# Patient Record
Sex: MALE | Race: BLACK OR AFRICAN AMERICAN | NOT HISPANIC OR LATINO | Employment: FULL TIME | URBAN - METROPOLITAN AREA
[De-identification: names, ages, dates, MRNs, and addresses within clinical notes are randomized per-mention and may not be internally consistent; named-entity substitution may affect disease eponyms.]

---

## 2019-06-19 ENCOUNTER — HOSPITAL ENCOUNTER (EMERGENCY)
Facility: HOSPITAL | Age: 49
Discharge: HOME | End: 2019-06-20
Attending: EMERGENCY MEDICINE

## 2019-06-19 DIAGNOSIS — T40.601A OPIATE OVERDOSE, ACCIDENTAL OR UNINTENTIONAL, INITIAL ENCOUNTER: Primary | ICD-10-CM

## 2019-06-19 DIAGNOSIS — R42 DIZZINESS: ICD-10-CM

## 2019-06-19 LAB
BUN SERPL-MCNC: 16 MG/DL (ref 6–30)
CHLORIDE SERPL-SCNC: 102 MMOL/L (ref 95–110)
CREAT SERPL-MCNC: 1.3 MG/DL (ref 0.5–1.4)
GLUCOSE SERPL-MCNC: 193 MG/DL (ref 70–110)
HCT VFR BLD CALC: 44 %PCV (ref 36–54)
POC IONIZED CALCIUM: 1.1 MMOL/L (ref 1.06–1.42)
POC TCO2 (MEASURED): 20 MMOL/L (ref 23–29)
POTASSIUM BLD-SCNC: 3.3 MMOL/L (ref 3.5–5.1)
SAMPLE: ABNORMAL
SODIUM BLD-SCNC: 138 MMOL/L (ref 136–145)

## 2019-06-19 PROCEDURE — 99291 CRITICAL CARE FIRST HOUR: CPT | Mod: 25

## 2019-06-19 PROCEDURE — 25000003 PHARM REV CODE 250: Performed by: EMERGENCY MEDICINE

## 2019-06-19 PROCEDURE — 93010 EKG 12-LEAD: ICD-10-PCS | Mod: ,,, | Performed by: INTERNAL MEDICINE

## 2019-06-19 PROCEDURE — 99291 CRITICAL CARE FIRST HOUR: CPT | Mod: ,,, | Performed by: EMERGENCY MEDICINE

## 2019-06-19 PROCEDURE — 80047 BASIC METABLC PNL IONIZED CA: CPT

## 2019-06-19 PROCEDURE — 93005 ELECTROCARDIOGRAM TRACING: CPT

## 2019-06-19 PROCEDURE — 82962 GLUCOSE BLOOD TEST: CPT

## 2019-06-19 PROCEDURE — 99291 PR CRITICAL CARE, E/M 30-74 MINUTES: ICD-10-PCS | Mod: ,,, | Performed by: EMERGENCY MEDICINE

## 2019-06-19 PROCEDURE — 93010 ELECTROCARDIOGRAM REPORT: CPT | Mod: ,,, | Performed by: INTERNAL MEDICINE

## 2019-06-19 PROCEDURE — 96374 THER/PROPH/DIAG INJ IV PUSH: CPT

## 2019-06-19 PROCEDURE — 63600175 PHARM REV CODE 636 W HCPCS: Performed by: EMERGENCY MEDICINE

## 2019-06-19 PROCEDURE — 96375 TX/PRO/DX INJ NEW DRUG ADDON: CPT

## 2019-06-19 RX ORDER — NALOXONE HCL 0.4 MG/ML
2 VIAL (ML) INJECTION
Status: COMPLETED | OUTPATIENT
Start: 2019-06-19 | End: 2019-06-19

## 2019-06-19 RX ORDER — ONDANSETRON 2 MG/ML
4 INJECTION INTRAMUSCULAR; INTRAVENOUS
Status: COMPLETED | OUTPATIENT
Start: 2019-06-19 | End: 2019-06-19

## 2019-06-19 RX ORDER — POTASSIUM CHLORIDE 20 MEQ/1
40 TABLET, EXTENDED RELEASE ORAL
Status: COMPLETED | OUTPATIENT
Start: 2019-06-19 | End: 2019-06-19

## 2019-06-19 RX ADMIN — NALOXONE HYDROCHLORIDE 2 MG: 0.4 INJECTION, SOLUTION INTRAMUSCULAR; INTRAVENOUS; SUBCUTANEOUS at 08:06

## 2019-06-19 RX ADMIN — POTASSIUM CHLORIDE 40 MEQ: 1500 TABLET, EXTENDED RELEASE ORAL at 08:06

## 2019-06-19 RX ADMIN — ONDANSETRON 4 MG: 2 INJECTION INTRAMUSCULAR; INTRAVENOUS at 08:06

## 2019-06-20 VITALS
RESPIRATION RATE: 18 BRPM | OXYGEN SATURATION: 99 % | WEIGHT: 260 LBS | SYSTOLIC BLOOD PRESSURE: 134 MMHG | TEMPERATURE: 98 F | BODY MASS INDEX: 37.31 KG/M2 | HEART RATE: 84 BPM | DIASTOLIC BLOOD PRESSURE: 72 MMHG

## 2019-06-20 LAB — POCT GLUCOSE: 175 MG/DL (ref 70–110)

## 2019-06-20 NOTE — ED NOTES
"LOC: Patient name and date of birth verified.  The patient is awake, alert and aware of environment with an appropriate affect, the patient is oriented x 3 and speaking appropriately.  Pt in NAD.      APPEARANCE: Patient appears ill, patient diaphoretic c/o dizziness.     SKIN: The skin is warm and dry, color consistent with ethnicity.    MUSCULOSKELETAL: Patient moving all extremities well, no obvious swelling or deformities noted. Denies weakness. States "I am very sleepy".     RESPIRATORY: Airway is open and patent, respirations are spontaneous, patient has a normal effort and rate, no accessory muscle use noted.    CARDIAC: Patient has a normal rate and rhythm. No c/o of chest pain.     ABDOMEN: Soft and non tender to palpation, no distention noted. Pt denies diarrhea. Endorses nausea    NEUROLOGIC: Eyes open spontaneously, behavior appropriate to situation, follows commands, pupils pinpoint and reactive. Speech clear.   "

## 2019-06-20 NOTE — ED TRIAGE NOTES
"López Alba, a 48 y.o. male presents to the ED w/ complaint of overdosing on what he thought was cocaine. Pt states he uses 1 or 2 times a week. Pt states "my friend gave me white powder which I thought was cocaine but it was supposedly heroin". Pt has no memory of LOC. Pt unaware of whom called EMS. Upon EMS arrival pt unresponsive and received total of 4 narcan. Pt diaphoretic and c/o dizziness at this time. Pt drowsy and appears ill upon arrival. Family at bedside, pt tearful with family.     Triage note:  Chief Complaint   Patient presents with    Drug Overdose     Pt reports having taken cocaine and is now experiencing dizziness, pt diaphoretic. AAOx4. Pt unresponsive upon arrival - EMS administered 4 total of Narcan.     Review of patient's allergies indicates:  No Known Allergies  No past medical history on file.  "
DISPLAY PLAN FREE TEXT

## 2019-06-20 NOTE — ED NOTES
"Pt AAO x 4. Pt states "feeling much better". Pupils 2 mm and reactive. Family at bedside. Will continue to monitor.   "

## 2019-06-20 NOTE — ED PROVIDER NOTES
Encounter Date: 6/19/2019    SCRIBE #1 NOTE: I, Wilbert Clemons, am scribing for, and in the presence of,  Dr. Plummer. I have scribed the following portions of the note - Other sections scribed: HPI, ROS, PE.       History     Chief Complaint   Patient presents with    Drug Overdose     Pt reports having taken cocaine and is now experiencing dizziness, pt diaphoretic. AAOx4. Pt unresponsive upon arrival - EMS administered 4 total of Narcan.     López Alba is a 48 y.o. male who presents today to the ED via EMS with a chief complaint of drug overdose. EMS states they found the patient unresponsive in his vehicle after doing heroin. Patient thought he was doing cocaine intranasally, but was actually given heroin. Unsure how long he was out for. He was given 2 of narcan intranasally, and then EMS gave him 2 of narcan IV. Patient does not take any other pills. Reports he was drinking beer at the time of the overdose. He does cocaine, but not everyday. Denies heroin usage. Endorses lightheadedness and dizziness.    The history is provided by the patient, the EMS personnel and medical records.     Review of patient's allergies indicates:  No Known Allergies  History reviewed. No pertinent past medical history.  History reviewed. No pertinent surgical history.  History reviewed. No pertinent family history.  Social History     Tobacco Use    Smoking status: Never Smoker   Substance Use Topics    Alcohol use: No    Drug use: Not on file     Review of Systems   Constitutional: Negative for fever.   HENT: Negative for sore throat.    Eyes: Negative for pain.   Respiratory: Negative for cough.    Cardiovascular: Negative for chest pain.   Gastrointestinal: Negative for vomiting.   Genitourinary: Negative for dysuria.   Musculoskeletal: Negative for back pain.   Skin: Negative for rash.   Neurological: Positive for dizziness and light-headedness.       Physical Exam     Initial Vitals [06/19/19 1947]   BP Pulse Resp Temp  SpO2   134/84 96 14 97.6 °F (36.4 °C) 98 %      MAP       --         Physical Exam    Nursing note and vitals reviewed.  Constitutional: He is diaphoretic.   Patient appears slightly ill.   HENT:   Mouth/Throat: Oropharynx is clear and moist.   Speaking clearly.   Eyes:   Pupils are 2 mm bilaterally.   Neck: Neck supple.   Cardiovascular: Normal rate, regular rhythm and normal heart sounds.   Pulmonary/Chest: No respiratory distress. He has no wheezes. He has no rhonchi. He has no rales.   Abdominal: Soft. He exhibits no distension. There is no tenderness.   Musculoskeletal: He exhibits no edema (extremity).   Neurological: He is alert.   Answering appropriate questions. No focal weaknesses.   Skin: Skin is warm.         ED Course   Procedures  Labs Reviewed   ISTAT PROCEDURE - Abnormal; Notable for the following components:       Result Value    POC Glucose 193 (*)     POC Potassium 3.3 (*)     POC TCO2 (MEASURED) 20 (*)     All other components within normal limits   POCT GLUCOSE - Abnormal; Notable for the following components:    POCT Glucose 175 (*)     All other components within normal limits        ECG Results          EKG 12-lead (Final result)  Result time 06/20/19 09:12:40    Final result by Interface, Lab In [facility] (06/20/19 09:12:40)                 Narrative:    Test Reason : R42,    Vent. Rate : 083 BPM     Atrial Rate : 083 BPM     P-R Int : 188 ms          QRS Dur : 098 ms      QT Int : 400 ms       P-R-T Axes : 050 053 035 degrees     QTc Int : 470 ms    Normal sinus rhythm  Normal ECG  When compared with ECG of 15-SEP-1998 01:36,  Vent. rate has increased BY  29 BPM  Nonspecific T wave abnormality, improved in Inferior leads  Nonspecific T wave abnormality no longer evident in Anterior-lateral leads  Confirmed by ZEENAT HANLEY MD ([id number]) on 6/20/2019 9:12:30 AM    Referred By: [email or web address]   SELF           Confirmed By:ZEENAT HANLEY MD                            Imaging Results    None          Medical  Decision Making:   History:   I obtained history from: EMS provider.  Old Medical Records: I decided to obtain old medical records.  Initial Assessment:   49 yo m, h/o recreational cocaine use, here s/p accidental heroin/opiate OD    Pt snorted white powder, wasn't told what it was??  Doesn't normally use/abuse opiates  Unclear who called EMS, they arrived on the scene, pt unresponsive  S/p 2 doses  Narcan (2 mg intranasal and 2 mg IV) woke up, return to normal MS but diaphoretic    No suicidal intent  + etoh use but no other ingestions/pills/etc    On exam, VSS, pt diaphoretic but o/w well-appearing, breathing comfortably, GCS 15  Differential Diagnosis:   Accidental opiate OD with response to narcan  Clinical Tests:   Lab Tests: Ordered and Reviewed  Medical Tests: Ordered and Reviewed  ED Management:  Observe in the ED 2/2 possible rebound resp depression  Multiple family members at bedside  Anticipate discharge home after 3-4 hour observation period if no recurrent resp depression            Scribe Attestation:   Scribe #1: I performed the above scribed service and the documentation accurately describes the services I performed. I attest to the accuracy of the note.    Attending Attestation:         Attending Critical Care:   Critical Care Times:   ==============================================================  · Total Critical Care Time - exclusive of procedural time: 35 minutes.  ==============================================================  Critical care was necessary to treat or prevent imminent or life-threatening deterioration of the following conditions: overdose.   Critical care was time spent personally by me on the following activities: obtaining history from patient or relative, examination of patient, review of x-rays / CT sent with the patient, review of old charts, ordering lab, x-rays, and/or EKG, development of treatment plan with patient or relative, ordering and performing treatments and  interventions, evaluation of patient's response to treatment and re-evaluation of patient's conition.       I, Dr. Parul Plummer, personally performed the services described in this documentation. All medical record entries made by the scribe were at my direction and in my presence.  I have reviewed the chart and agree that the record reflects my personal performance and is accurate and complete. Parul Plummer MD.  8:29 PM 06/20/2019           Clinical Impression:       ICD-10-CM ICD-9-CM   1. Opiate overdose, accidental or unintentional, initial encounter T40.601A 965.00     E850.2   2. Dizziness R42 780.4                                Parul Plummer MD  06/20/19 0919

## 2019-06-20 NOTE — ED NOTES
Rounding  There is no change in patient status at this time and is resting comfortably in bed with side rails up, call bell in reach, and the bed locked and in its lowest position.   Patient appears to be in no acute distress at this time and denies chest pain and shortness of breath. Respirations are even and unlabored with equal chest rise and fall.  Patient given plan of care update. With no complaints or concerns from the patient, the nurse will continue to monitor patient and keep patient up-to-date with progress of care plan and provide support. Patient advised to call with any needs.

## 2019-06-20 NOTE — PROVIDER PROGRESS NOTES - EMERGENCY DEPT.
ED Physician Hand-off Note:  9:00 PM    ED Course: I assumed care of patient from off-going ED physician team. Briefly, Patient is a 48 M here for drug overdose.    At the time of signout plan was pending respiratory monitoring.      Medications given in the ED:    Medications   potassium chloride SA CR tablet 40 mEq (40 mEq Oral Given 6/19/19 2038)   naloxone 0.4 mg/mL injection 2 mg (2 mg Intravenous Given 6/19/19 2048)   ondansetron injection 4 mg (4 mg Intravenous Given 6/19/19 2050)     12:30 AM  Patient re-evaluated, has no new complaints.  He is awake and oriented answers questions appropriately.  His vital signs have remained stable during his observation period.  Patient stable for discharge    Disposition: discharge    Patient and fmaily member at bedside comfortable with plan. Patient counseled regarding exam, results, diagnosis, treatment, and plan.    Impression: drug overdose    Encounter Date: 6/19/2019    ED Physician Progress Notes

## 2019-06-20 NOTE — ED NOTES
Patient Care Assumed at this time. Patient is resting in bed, side rails up, call bell in reach, with bed locked and low. Patient appears to be in no acute distress. Patient complaining of nothing at this time, denies chest pain and shortness of breath. Respirations are even and unlabored. With no complaints or concerns from the patient, the nurse will continue to monitor patient and keep patient up-to-date with progress of care plan and provide support. Patient advised to call with any needs.

## 2019-06-20 NOTE — ED NOTES
"Pt AAOx4. Pt resting in stretcher with HOB at 90 degrees. Pt endorses being "sleepy". Pt more active with family members. Will continue to monitor.   "

## 2019-06-20 NOTE — ED NOTES
ER MD aware of pt status. Pt c/o dizziness and nausea. Narcan administered. Pt pupils pinpoint. Will continue to monitor.

## 2021-02-14 ENCOUNTER — HOSPITAL ENCOUNTER (EMERGENCY)
Facility: HOSPITAL | Age: 51
Discharge: HOME | End: 2021-02-14
Attending: FAMILY MEDICINE

## 2021-02-14 VITALS
BODY MASS INDEX: 33.18 KG/M2 | SYSTOLIC BLOOD PRESSURE: 164 MMHG | HEART RATE: 82 BPM | WEIGHT: 245 LBS | TEMPERATURE: 99 F | HEIGHT: 72 IN | OXYGEN SATURATION: 96 % | RESPIRATION RATE: 18 BRPM | DIASTOLIC BLOOD PRESSURE: 84 MMHG

## 2021-02-14 DIAGNOSIS — M54.42 ACUTE LEFT-SIDED LOW BACK PAIN WITH LEFT-SIDED SCIATICA: Primary | ICD-10-CM

## 2021-02-14 PROCEDURE — 63600175 PHARM REV CODE 636 W HCPCS: Mod: ER | Performed by: PHYSICIAN ASSISTANT

## 2021-02-14 PROCEDURE — 99284 EMERGENCY DEPT VISIT MOD MDM: CPT | Mod: 25,ER

## 2021-02-14 PROCEDURE — 96372 THER/PROPH/DIAG INJ SC/IM: CPT | Mod: ER

## 2021-02-14 RX ORDER — NAPROXEN 500 MG/1
500 TABLET ORAL 2 TIMES DAILY WITH MEALS
Qty: 12 TABLET | Refills: 0 | Status: SHIPPED | OUTPATIENT
Start: 2021-02-14

## 2021-02-14 RX ORDER — KETOROLAC TROMETHAMINE 30 MG/ML
30 INJECTION, SOLUTION INTRAMUSCULAR; INTRAVENOUS
Status: COMPLETED | OUTPATIENT
Start: 2021-02-14 | End: 2021-02-14

## 2021-02-14 RX ORDER — DEXAMETHASONE SODIUM PHOSPHATE 4 MG/ML
8 INJECTION, SOLUTION INTRA-ARTICULAR; INTRALESIONAL; INTRAMUSCULAR; INTRAVENOUS; SOFT TISSUE
Status: COMPLETED | OUTPATIENT
Start: 2021-02-14 | End: 2021-02-14

## 2021-02-14 RX ORDER — METHOCARBAMOL 500 MG/1
500 TABLET, FILM COATED ORAL 3 TIMES DAILY
Qty: 15 TABLET | Refills: 0 | Status: SHIPPED | OUTPATIENT
Start: 2021-02-14 | End: 2021-02-19

## 2021-02-14 RX ADMIN — DEXAMETHASONE SODIUM PHOSPHATE 8 MG: 4 INJECTION, SOLUTION INTRAMUSCULAR; INTRAVENOUS at 01:02

## 2021-02-14 RX ADMIN — KETOROLAC TROMETHAMINE 30 MG: 30 INJECTION, SOLUTION INTRAMUSCULAR at 12:02

## 2023-01-01 ENCOUNTER — HOSPITAL ENCOUNTER (EMERGENCY)
Facility: HOSPITAL | Age: 53
End: 2023-01-04
Attending: EMERGENCY MEDICINE
Payer: MEDICAID

## 2023-01-01 DIAGNOSIS — I46.9 CARDIAC ARREST: Primary | ICD-10-CM

## 2023-01-01 PROCEDURE — 99291 CRITICAL CARE FIRST HOUR: CPT | Mod: ,,, | Performed by: EMERGENCY MEDICINE

## 2023-01-01 PROCEDURE — 99900035 HC TECH TIME PER 15 MIN (STAT)

## 2023-01-01 PROCEDURE — 99291 PR CRITICAL CARE, E/M 30-74 MINUTES: ICD-10-PCS | Mod: ,,, | Performed by: EMERGENCY MEDICINE

## 2023-01-01 PROCEDURE — 99291 CRITICAL CARE FIRST HOUR: CPT | Mod: 25

## 2023-01-01 PROCEDURE — 96374 THER/PROPH/DIAG INJ IV PUSH: CPT

## 2023-01-01 PROCEDURE — 96375 TX/PRO/DX INJ NEW DRUG ADDON: CPT

## 2023-01-01 PROCEDURE — 25000003 PHARM REV CODE 250: Performed by: EMERGENCY MEDICINE

## 2023-01-01 PROCEDURE — 63600175 PHARM REV CODE 636 W HCPCS: Performed by: EMERGENCY MEDICINE

## 2023-01-01 RX ORDER — NALOXONE HYDROCHLORIDE 1 MG/ML
2 INJECTION INTRAMUSCULAR; INTRAVENOUS; SUBCUTANEOUS ONCE
Status: COMPLETED | OUTPATIENT
Start: 2023-01-01 | End: 2023-01-01

## 2023-01-01 RX ORDER — CALCIUM CHLORIDE INJECTION 100 MG/ML
INJECTION, SOLUTION INTRAVENOUS CODE/TRAUMA/SEDATION MEDICATION
Status: COMPLETED | OUTPATIENT
Start: 2023-01-01 | End: 2023-01-01

## 2023-01-01 RX ORDER — EPINEPHRINE 0.1 MG/ML
INJECTION INTRAVENOUS CODE/TRAUMA/SEDATION MEDICATION
Status: COMPLETED | OUTPATIENT
Start: 2023-01-01 | End: 2023-01-01

## 2023-01-01 RX ORDER — NALOXONE HCL 0.4 MG/ML
2 VIAL (ML) INJECTION
Status: COMPLETED | OUTPATIENT
Start: 2023-01-01 | End: 2023-01-01

## 2023-01-01 RX ADMIN — CALCIUM CHLORIDE INJECTION 1 G: 100 INJECTION, SOLUTION INTRAVENOUS at 08:01

## 2023-01-01 RX ADMIN — EPINEPHRINE 1 MG: 0.1 INJECTION, SOLUTION INTRAVENOUS at 08:01

## 2023-01-01 RX ADMIN — NALOXONE HYDROCHLORIDE 2 MG: 1 INJECTION PARENTERAL at 08:01

## 2023-01-01 RX ADMIN — NALXONE HYDROCHLORIDE 2 MG: 0.4 INJECTION INTRAMUSCULAR; INTRAVENOUS; SUBCUTANEOUS at 08:01

## 2023-01-04 NOTE — ED NOTES
Dr. Thayer determined no cardiac activity with ultrasound. Pt asystole. No palpable pulse. Official time of death called at 2020 by Dr. Thayer.

## 2023-01-04 NOTE — ED NOTES
Pt arrived by [facility].  Pt found unresponsive by bystanders. Pt in vfib and received 360 defib shock, total 8 epi, 100 bicarb, 1 calcium with EMS prior to arrival to [facility]. Pt intubated with 7.0 ET. 2 humeral IOs for access. Pt on elena device at this time and transferred onto ED stretcher.

## 2023-01-04 NOTE — ED NOTES
Serge device removed at this time and manual compressions started by ER staff. Pads and monitoring placed on pt.

## 2023-01-04 NOTE — ED NOTES
Compressions stopped. Dr. Thayer using ultrasound to determine cardiac activity. No activity noted and no pulse palpable at this time.

## 2023-01-04 NOTE — ED PROVIDER NOTES
Encounter Date: 1/3/2023       History     Chief Complaint   Patient presents with    Cardiac Arrest     Found unresponsive by EMS from suspected overdose. Cardiac monitoring initially showed asystole. CPR initiated at approximately 1945. Pt received 7 epi, 100 bicarb and 1 calcium in route.      GABRIEL Alba is a 52-year-old male with a history of heroin abuse and previous history of drug overdose presenting with cardiac arrest and unresponsive episode.  Patient was found unresponsive by EMS from suspected overdose.  Unknown downtime.  Initially he was found to be in asystole with CPR initiated 45 minutes prior to arrival.  Patient received 8 doses of epi, 100 bicarb and 1 calcium prior to arrival.  He remained in asystole.  On arrival he is an active CPR, no return of spontaneous circulation was achieved.  His pupils are fixed and dilated.  He is unresponsive.  There are no pulses.  Continuing active CPR.  He was intubated in the field with a 7.5 ET tube.  He has the Serge with active CPR EN route.    Review of patient's allergies indicates:  No Known Allergies  No past medical history on file.  No past surgical history on file.  No family history on file.  Social History     Tobacco Use    Smoking status: Every Day    Smokeless tobacco: Never   Substance Use Topics    Alcohol use: Yes     Comment: OCC    Drug use: Never     Review of Systems   Unable to perform ROS: Patient unresponsive     Physical Exam     Initial Vitals   BP Pulse Resp Temp SpO2   -- -- -- -- --      MAP       --         Physical Exam    Nursing note and vitals reviewed.    Gen/Constitutional:  Unresponsive, intubated with active CPR  Head: Normocephalic, Atraumatic  Neck:  No tracheal deviation  Eyes:  Pupils fixed and dilated at 4 mm  Ears, Nose and Throat:  ETT with BVM  Cardiac:  Active CPR, pulseless  Pulmonary:  ET tube in place, BVM  GI:  Abdomen round and distended  :  Normal male genitalia  Musculoskeletal:  Extremities  cool  Skin: No rashes, cyanosis or jaundice.  Neuro:  Intubated, unresponsive with active CPR  Psych:  Unresponsive      ED Course   Critical Care    Date/Time: 1/3/2023 8:10 PM  Performed by: Damaso Thayer DO  Authorized by: Damaso Thayer DO   Direct patient critical care time: 15 minutes  Additional history critical care time: 15 minutes  Ordering / reviewing critical care time: 10 minutes  Documentation critical care time: 10 minutes  Total critical care time (exclusive of procedural time) : 50 minutes  Critical care was necessary to treat or prevent imminent or life-threatening deterioration of the following conditions: cardiac failure and respiratory failure.  Critical care was time spent personally by me on the following activities: ventilator management, pulse oximetry, obtaining history from patient or surrogate, examination of patient and evaluation of patient's response to treatment.      Labs Reviewed   HIV 1 / 2 ANTIBODY   HEPATITIS C ANTIBODY          Imaging Results    None          Medications   EPINEPHrine 0.1 mg/mL injection (1 mg Intravenous Given 1/3/23 2016)   calcium chloride 100 mg/mL (10 %) injection (1 g Intravenous Given 1/3/23 2017)   naloxone 0.4 mg/mL injection 2 mg (2 mg Intravenous Given 1/3/23 2013)   naloxone injection 2 mg (2 mg Intravenous Given 1/3/23 2013)     Medical Decision Making:   History:   I obtained history from: EMS provider.       <> Summary of History: Brought in via EMS for unresponsive with active CPR for asystole for 45 minutes prior to arrival  Old Medical Records: I decided to obtain old medical records.  Old Records Summarized: records from previous admission(s).       <> Summary of Records: Previous admission for heroin overdose  Initial Assessment:   López Alba is a 52-year-old male with a history of heroin abuse and previous history of drug overdose presenting with cardiac arrest and unresponsive episode.   Differential Diagnosis:   Heroin overdose,  respiratory failure, cardiac arrest  Clinical Tests:   Lab Tests: Ordered                 Emergent evaluation of patient presenting via EMS for unresponsive with active CPR EN route.  He was found to be in asystole, unresponsive with unknown downtime prior to arrival by EMS.  They attempted CPR with asystole with 8 rounds of epinephrine, calcium gluconate as well as bicarb for 45 minutes prior to arrival.  Unable to achieve return of spontaneous circulation.  On arrival he is cool, pulseless with active CPR in place.  He has an ET tube in place with BVM.  Continued to further rounds of CPR with 2 further episodes of epinephrine, calcium and 4 mg Narcan.  No cardiac activity, remain pulseless with asystole.  Time of death a 8:00 p.m..  I attempted to notify family with multiple numbers attempted, unable to contact or reach any of the phone numbers.  Will defer to nursing and leadership team to attempt further contact.  Please see critical care note for critical care time.    Complexity:  Critical Care       Clinical Impression:   Final diagnoses:  [I46.9] Cardiac arrest (Primary)        ED Disposition Condition                    Damaso Thayer DO, FAAEM  Emergency Staff Physician   Dept of Emergency Medicine   [facility]  Spectralink: [phone]        Disclaimer: This note has been generated using voice-recognition software. There may be typographical errors that have been missed during proof-reading.       Damaso Thayer DO  23 [id number]